# Patient Record
Sex: MALE | Race: WHITE | Employment: OTHER | URBAN - METROPOLITAN AREA
[De-identification: names, ages, dates, MRNs, and addresses within clinical notes are randomized per-mention and may not be internally consistent; named-entity substitution may affect disease eponyms.]

---

## 2017-11-13 ENCOUNTER — NEW PATIENT EMERGENCY (OUTPATIENT)
Dept: URBAN - METROPOLITAN AREA CLINIC 43 | Facility: CLINIC | Age: 27
End: 2017-11-13

## 2017-11-13 DIAGNOSIS — H10.31: ICD-10-CM

## 2017-11-13 PROCEDURE — 99211 OFF/OP EST MAY X REQ PHY/QHP: CPT

## 2017-11-13 RX ORDER — NAPHAZOLINE HYDROCHLORIDE AND PHENIRAMINE MALEATE .2675; 3.15 MG/ML; MG/ML: 1 SOLUTION/ DROPS OPHTHALMIC AS NEEDED

## 2017-11-13 RX ORDER — GANCICLOVIR 1.5 MG/G
1 GEL OPHTHALMIC
Start: 2017-11-14

## 2017-11-13 RX ORDER — DUREZOL 0.5 MG/ML
1 EMULSION OPHTHALMIC TWICE A DAY
Start: 2017-11-13

## 2017-11-13 ASSESSMENT — VISUAL ACUITY
OS_CC: 20/20
OD_CC: 20/20-1

## 2017-11-13 ASSESSMENT — TONOMETRY: OD_IOP_MMHG: 21

## 2017-11-15 ENCOUNTER — NEW PATIENT EMERGENCY (OUTPATIENT)
Dept: URBAN - METROPOLITAN AREA CLINIC 46 | Facility: CLINIC | Age: 27
End: 2017-11-15

## 2017-11-15 DIAGNOSIS — H10.31: ICD-10-CM

## 2017-11-15 PROCEDURE — 99201 LIMITED PROBLEM FOCUSED - E/M: CPT

## 2017-11-15 ASSESSMENT — TONOMETRY
OS_IOP_MMHG: 20
OD_IOP_MMHG: 24

## 2020-05-28 NOTE — PATIENT DISCUSSION
Glasses Rx given. Prism added. If still having diplopia with near or computer will make rx for near with prism.

## 2020-05-28 NOTE — PATIENT DISCUSSION
Explained to pt that he may have had a vascular event in the past that could have triggered this. He described an event 5-6 years ago that may have caused this. He is under controll for HBP and sees his PCP 2xyear and is going to see a cardiologist as well.

## 2020-08-06 NOTE — PATIENT DISCUSSION
Pt's IOP seems to fluctuate and may be an indicator of glaucoma, but follow as a Low Risk suspect for now.

## 2020-08-06 NOTE — PATIENT DISCUSSION
Follow with Dr. Juani Richardson or Dr. Viridiana Zapata annually. If changes then send back to [facility].

## 2021-07-07 NOTE — PATIENT DISCUSSION
Pt's IOP seems to fluctuate and may be an indicator of glaucoma, but follow as a Low Risk suspect for now. RTC to [address] [facility] if any concerns. He saw him for a consult 8/2020.

## 2021-07-07 NOTE — PATIENT DISCUSSION
Problem: Pain  Goal: #Acceptable pain level achieved/maintained at rest using NRS/Faces  Description: This goal is used for patients who can self-report.  Acceptable means the level is at or below the identified comfort/function goal.  Note: Patient's pain is controlled and maintained.  Patient does not report pain while at rest and rated it 1-2/10.  Patient does not want medication interventions at this time. Reports some mild pain while walking.  Worked with PT today.  Will continue plan of care.        VA stable with prism.

## 2021-08-19 NOTE — PATIENT DISCUSSION
Pt mentions today that he has had some episodes, not frequent of diplopia when tired. Recheck 6 months or PRN.

## 2021-08-19 NOTE — PATIENT DISCUSSION
Pt's IOP seems to fluctuate and may be an indicator of glaucoma, but follow as a Low Risk suspect for now. RTC to [address] [facility] if any concerns. He saw him for a consult 8/2020.

## 2022-02-21 NOTE — PATIENT DISCUSSION
Pt's IOP seems to fluctuate and may be an indicator of glaucoma, but follow as a Low Risk suspect for now. RTC to [address] [facility] if any concerns. He saw him for a consult 8/2020.

## 2022-04-26 NOTE — PATIENT DISCUSSION
Discussed AREDS 2 supplements, UV protection and green leafy vegetables.
Explained to pt that he may have had a vascular event in the past that could have triggered this. He described an event 5-6 years ago that may have caused this. He is under controll for HBP and sees his PCP 2xyear and is going to see a cardiologist as well.
Follow with Dr. Adrián Ford.
Follow with Dr. Kya Wren.
Glasses Rx given.
Glasses Rx given. Prism added. If still having diplopia with near or computer will make rx for near with prism.
Monitor.
Observe.
Patient understands condition, prognosis and need for follow up care.
Pt's IOP seems to fluctuate and may be an indicator of glaucoma, but follow as a Low Risk suspect for now.
RTC for Photo/VF, and then IOP check.
Recommended observation.
Recommended prism.
Retinal tear and detachment warning symptoms reviewed and patient instructed to call immediately if increasing floaters, flashes, or decreasing peripheral vision.
Stable prism.
Stable.
VA stable with prism.
Render Risk Assessment In Note?: no
Additional Notes: Discussed this can take several weeks to resolve. Advised if not better in two months, we will need to biopsy this for a diagnosis.
Detail Level: Detailed

## 2022-06-21 NOTE — PATIENT DISCUSSION
Pt's IOP seems to fluctuate and may be an indicator of glaucoma, but follow as a Low Risk suspect for now. RTC to [address] [facility] if any concerns. He saw him for a consult 8/2020.

## 2022-06-21 NOTE — PATIENT DISCUSSION
Previous: Explained to pt that he may have had a vascular event in the past that could have triggered this. He described an event 5-6 years ago that may have caused this. He is under control for HBP and sees his PCP 2xyear and is going to see a cardiologist as well.

## 2022-08-10 NOTE — PATIENT DISCUSSION
Previous 2021: VF is full OU, despite some fixation losses OS. OD was more concerning on OCT. Overall full OU.

## 2022-08-10 NOTE — PATIENT DISCUSSION
Pt's IOP seems to fluctuate and may be an indicator of glaucoma, but follow as a Low Risk suspect for now. RTC to [address] [facility] if any concerns. He saw him for a consult 8/2020.